# Patient Record
Sex: FEMALE
[De-identification: names, ages, dates, MRNs, and addresses within clinical notes are randomized per-mention and may not be internally consistent; named-entity substitution may affect disease eponyms.]

---
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
## 2021-12-07 ENCOUNTER — OFFICE VISIT (OUTPATIENT)
Dept: FAMILY MEDICINE CLINIC | Facility: CLINIC | Age: 21
End: 2021-12-07
Payer: COMMERCIAL

## 2021-12-07 VITALS
DIASTOLIC BLOOD PRESSURE: 66 MMHG | WEIGHT: 152 LBS | HEIGHT: 62 IN | RESPIRATION RATE: 18 BRPM | HEART RATE: 77 BPM | TEMPERATURE: 98 F | SYSTOLIC BLOOD PRESSURE: 99 MMHG | BODY MASS INDEX: 27.97 KG/M2

## 2021-12-07 DIAGNOSIS — H93.8X3 EAR FULLNESS, BILATERAL: Primary | ICD-10-CM

## 2021-12-07 PROCEDURE — 3078F DIAST BP <80 MM HG: CPT | Performed by: FAMILY MEDICINE

## 2021-12-07 PROCEDURE — 3008F BODY MASS INDEX DOCD: CPT | Performed by: FAMILY MEDICINE

## 2021-12-07 PROCEDURE — 3074F SYST BP LT 130 MM HG: CPT | Performed by: FAMILY MEDICINE

## 2021-12-07 PROCEDURE — 99213 OFFICE O/P EST LOW 20 MIN: CPT | Performed by: FAMILY MEDICINE

## 2021-12-07 RX ORDER — FLUTICASONE PROPIONATE 50 MCG
2 SPRAY, SUSPENSION (ML) NASAL DAILY
Qty: 1 EACH | Refills: 2 | Status: SHIPPED | OUTPATIENT
Start: 2021-12-07 | End: 2022-12-07

## 2021-12-07 RX ORDER — CETIRIZINE HYDROCHLORIDE 10 MG/1
10 TABLET ORAL DAILY
COMMUNITY

## 2021-12-07 RX ORDER — GARLIC EXTRACT 500 MG
1 CAPSULE ORAL DAILY
COMMUNITY

## 2021-12-07 NOTE — PROGRESS NOTES
Subjective:   Patient ID: Stone Rosenberg is a 22year old female. Ear Pain   There is pain in both ears. This is a new problem. The current episode started 1 to 4 weeks ago. The problem has been waxing and waning. There has been no fever.        Histo

## 2022-11-09 ENCOUNTER — OFFICE VISIT (OUTPATIENT)
Dept: FAMILY MEDICINE CLINIC | Facility: CLINIC | Age: 22
End: 2022-11-09
Payer: COMMERCIAL

## 2022-11-09 VITALS
SYSTOLIC BLOOD PRESSURE: 114 MMHG | BODY MASS INDEX: 25.52 KG/M2 | WEIGHT: 144 LBS | RESPIRATION RATE: 19 BRPM | HEIGHT: 63 IN | OXYGEN SATURATION: 98 % | DIASTOLIC BLOOD PRESSURE: 61 MMHG | HEART RATE: 76 BPM

## 2022-11-09 DIAGNOSIS — L72.3 SEBACEOUS CYST: ICD-10-CM

## 2022-11-09 DIAGNOSIS — R22.9 LOCALIZED SKIN MASS, LUMP, OR SWELLING: Primary | ICD-10-CM

## 2022-11-09 PROCEDURE — 3008F BODY MASS INDEX DOCD: CPT | Performed by: FAMILY MEDICINE

## 2022-11-09 PROCEDURE — 3074F SYST BP LT 130 MM HG: CPT | Performed by: FAMILY MEDICINE

## 2022-11-09 PROCEDURE — 3078F DIAST BP <80 MM HG: CPT | Performed by: FAMILY MEDICINE

## 2022-11-09 PROCEDURE — 99213 OFFICE O/P EST LOW 20 MIN: CPT | Performed by: FAMILY MEDICINE

## 2023-03-01 ENCOUNTER — LAB ENCOUNTER (OUTPATIENT)
Dept: LAB | Age: 23
End: 2023-03-01
Attending: FAMILY MEDICINE
Payer: COMMERCIAL

## 2023-03-01 ENCOUNTER — OFFICE VISIT (OUTPATIENT)
Dept: FAMILY MEDICINE CLINIC | Facility: CLINIC | Age: 23
End: 2023-03-01

## 2023-03-01 VITALS
RESPIRATION RATE: 17 BRPM | SYSTOLIC BLOOD PRESSURE: 107 MMHG | DIASTOLIC BLOOD PRESSURE: 73 MMHG | HEIGHT: 62 IN | BODY MASS INDEX: 26.87 KG/M2 | HEART RATE: 78 BPM | OXYGEN SATURATION: 98 % | WEIGHT: 146 LBS

## 2023-03-01 DIAGNOSIS — Z01.419 WELL WOMAN EXAM WITH ROUTINE GYNECOLOGICAL EXAM: Primary | ICD-10-CM

## 2023-03-01 PROCEDURE — 90651 9VHPV VACCINE 2/3 DOSE IM: CPT | Performed by: FAMILY MEDICINE

## 2023-03-01 PROCEDURE — 3074F SYST BP LT 130 MM HG: CPT | Performed by: FAMILY MEDICINE

## 2023-03-01 PROCEDURE — 3008F BODY MASS INDEX DOCD: CPT | Performed by: FAMILY MEDICINE

## 2023-03-01 PROCEDURE — 99395 PREV VISIT EST AGE 18-39: CPT | Performed by: FAMILY MEDICINE

## 2023-03-01 PROCEDURE — 90471 IMMUNIZATION ADMIN: CPT | Performed by: FAMILY MEDICINE

## 2023-03-01 PROCEDURE — 3078F DIAST BP <80 MM HG: CPT | Performed by: FAMILY MEDICINE

## 2023-03-02 LAB
BUN: 15 MG/DL (ref 7–25)
CALCIUM: 9.3 MG/DL (ref 8.6–10.2)
CARBON DIOXIDE: 23 MMOL/L (ref 20–32)
CHLORIDE: 104 MMOL/L (ref 98–110)
CHOL/HDLC RATIO: 3.5 (CALC)
CHOLESTEROL, TOTAL: 166 MG/DL
CREATININE: 0.78 MG/DL (ref 0.5–0.96)
EGFR: 111 ML/MIN/1.73M2
GLUCOSE: 84 MG/DL (ref 65–99)
HDL CHOLESTEROL: 47 MG/DL
LDL-CHOLESTEROL: 98 MG/DL (CALC)
NON-HDL CHOLESTEROL: 119 MG/DL (CALC)
POTASSIUM: 3.9 MMOL/L (ref 3.5–5.3)
SODIUM: 136 MMOL/L (ref 135–146)
TRIGLYCERIDES: 117 MG/DL

## 2023-03-06 LAB
CHLAMYDIA TRACHOMATIS$RNA, TMA: DETECTED
NEISSERIA GONORRHOEAE$RNA, TMA: NOT DETECTED

## 2023-09-11 ENCOUNTER — OFFICE VISIT (OUTPATIENT)
Dept: FAMILY MEDICINE CLINIC | Facility: CLINIC | Age: 23
End: 2023-09-11

## 2023-09-11 VITALS
HEIGHT: 63 IN | OXYGEN SATURATION: 99 % | DIASTOLIC BLOOD PRESSURE: 67 MMHG | WEIGHT: 155 LBS | BODY MASS INDEX: 27.46 KG/M2 | SYSTOLIC BLOOD PRESSURE: 102 MMHG

## 2023-09-11 DIAGNOSIS — R10.2 PELVIC PAIN: ICD-10-CM

## 2023-09-11 DIAGNOSIS — R22.0 SWELLING OF UPPER LIP: Primary | ICD-10-CM

## 2023-09-11 LAB
BILIRUBIN: NEGATIVE
CONTROL LINE PRESENT WITH A CLEAR BACKGROUND (YES/NO): YES YES/NO
GLUCOSE (URINE DIPSTICK): NEGATIVE MG/DL
KETONES (URINE DIPSTICK): NEGATIVE MG/DL
KIT LOT #: 1078 NUMERIC
MULTISTIX LOT#: ABNORMAL NUMERIC
NITRITE, URINE: NEGATIVE
OCCULT BLOOD: NEGATIVE
PH, URINE: 7 (ref 4.5–8)
PREGNANCY TEST, URINE: NEGATIVE
PROTEIN (URINE DIPSTICK): NEGATIVE MG/DL
SPECIFIC GRAVITY: 1.03 (ref 1–1.03)
URINE-COLOR: YELLOW
UROBILINOGEN,SEMI-QN: 0.2 MG/DL (ref 0–1.9)

## 2023-09-11 PROCEDURE — 81002 URINALYSIS NONAUTO W/O SCOPE: CPT | Performed by: FAMILY MEDICINE

## 2023-09-11 PROCEDURE — 81025 URINE PREGNANCY TEST: CPT | Performed by: FAMILY MEDICINE

## 2023-09-11 PROCEDURE — 3008F BODY MASS INDEX DOCD: CPT | Performed by: FAMILY MEDICINE

## 2023-09-11 PROCEDURE — 3078F DIAST BP <80 MM HG: CPT | Performed by: FAMILY MEDICINE

## 2023-09-11 PROCEDURE — 3074F SYST BP LT 130 MM HG: CPT | Performed by: FAMILY MEDICINE

## 2023-09-11 PROCEDURE — 99214 OFFICE O/P EST MOD 30 MIN: CPT | Performed by: FAMILY MEDICINE

## 2023-09-11 NOTE — PROGRESS NOTES
HPI:    Stone Kang is a 25year old female presents clinic with multiple concerns. Last week, the right side of her upper symptoms swollen for 3 days. Does not recall eating any new foods, using any new skin products. Took Zyrtec, and swelling initially subsided. Denies difficulty swallowing, shortness of breath, wheezing. No history of allergies in the past.  Additionally, 2 weeks back patient woke up in the middle the night with severe right-sided pelvic pain. Got up to urinate, felt that pain still did not subside when she got back into bed. Denies fevers, chills, nausea, vomiting, urinary frequency, abnormal vaginal discharge, new sexual partners. Patient's last menstrual period was 08/24/2023 (exact date). Regular cycles, no concerns. Sexually active, not using any contraception. HISTORY:  No past medical history on file. No past surgical history on file. No family history on file. Social History:   Social History     Socioeconomic History    Marital status: Single   Tobacco Use    Smoking status: Never    Smokeless tobacco: Never   Vaping Use    Vaping Use: Never used   Substance and Sexual Activity    Alcohol use: Yes     Comment: Socially    Drug use: Never        Medications (Active prior to today's visit):  No current outpatient medications on file. Allergies:  No Known Allergies      Depression Screening (PHQ-2/PHQ-9): Over the LAST 2 WEEKS                         ROS:   Review of Systems   All other systems reviewed and are negative. PHYSICAL EXAM:      09/11/23  1405   BP: 102/67   BP Location: Right arm   Patient Position: Sitting   Cuff Size: adult   SpO2: 99%   Weight: 155 lb (70.3 kg)   Height: 5' 3\" (1.6 m)     Physical Exam  Constitutional:       General: She is not in acute distress. Cardiovascular:      Rate and Rhythm: Normal rate and regular rhythm. Heart sounds: Normal heart sounds.    Pulmonary:      Effort: Pulmonary effort is normal. No respiratory distress. Breath sounds: Normal breath sounds. Abdominal:      General: Bowel sounds are normal. There is no distension. Palpations: Abdomen is soft. There is no mass. Tenderness: There is abdominal tenderness (right lower quadrant, no rebound). There is no right CVA tenderness, left CVA tenderness, guarding or rebound. Hernia: No hernia is present. Neurological:      Mental Status: She is alert. Mental status is at baseline. Psychiatric:         Mood and Affect: Mood normal.         ASSESSMENT/PLAN:   (R22.0) Swelling of upper lip  (primary encounter diagnosis)  Plan: ALLERGY - INTERNAL  -Completely asymptomatic today, reviewed pictures from when she was symptomatic. Unsure regarding what triggered reaction. Referred to Dr. Krysta Jimenez for further management    (R10.2) Pelvic pain  Plan: URINE CULTURE, ROUTINE, US PELVIS W DOPPLER         (CPT=93975/05894), URINALYSIS NONAUTO W/O         SCOPE, URINE PREGNANCY TEST  -Negative pregnancy test.  UA positive for large leuks. Culture to lab. Patient states that this happened a few months back. Ultrasound pelvis ordered, if urine culture is positive, will treat and if symptoms, no need for ultrasound. If culture is negative, can proceed with imaging. Patient agreeable to plan. Responsible party/patient verbalized understanding of information discussed. No barriers to learning observed. Orders This Visit:  Orders Placed This Encounter      POC Urinalysis, Manual Dip without microscopy [98730]      POC Urine pregnancy test [20084]      Urine Culture, Routine [E]      Meds This Visit:  Requested Prescriptions      No prescriptions requested or ordered in this encounter       Imaging & Referrals:  [facility] - [facility] [facility] (NWR=[phone])       The 21st Century cures Act makes medical notes like these available to patients in the interest of transparency.   However, be advised that this is a medical document. It is intended as peer to peer communication. It is written in medical language and may contain abbreviations or verbiage that are unfamiliar. It may appear blunt or direct. Medical documents are intended to carry relevant information, facts as evident, and the clinical opinion of the practitioner. This note was created by [employer] voice recognition. Errors in content may be related to improper recognition by the system; efforts to review and correct have been done but errors may still exist. Please contact me with any questions.        9/11/2023  Antonella Valdez MD

## 2023-10-03 ENCOUNTER — HOSPITAL ENCOUNTER (OUTPATIENT)
Dept: ULTRASOUND IMAGING | Age: 23
Discharge: HOME | End: 2023-10-03
Attending: FAMILY MEDICINE
Payer: COMMERCIAL

## 2023-10-03 DIAGNOSIS — R10.2 PELVIC PAIN: ICD-10-CM

## 2023-10-03 PROCEDURE — 76856 US EXAM PELVIC COMPLETE: CPT | Performed by: FAMILY MEDICINE

## 2023-10-03 PROCEDURE — 93975 VASCULAR STUDY: CPT | Performed by: FAMILY MEDICINE

## 2023-10-03 PROCEDURE — 76830 TRANSVAGINAL US NON-OB: CPT | Performed by: FAMILY MEDICINE

## 2023-11-29 ENCOUNTER — LAB ENCOUNTER (OUTPATIENT)
Dept: LAB | Age: 23
End: 2023-11-29
Attending: ALLERGY & IMMUNOLOGY
Payer: COMMERCIAL

## 2023-11-29 ENCOUNTER — OFFICE VISIT (OUTPATIENT)
Dept: ALLERGY | Facility: CLINIC | Age: 23
End: 2023-11-29
Payer: COMMERCIAL

## 2023-11-29 VITALS — HEART RATE: 73 BPM | SYSTOLIC BLOOD PRESSURE: 106 MMHG | OXYGEN SATURATION: 92 % | DIASTOLIC BLOOD PRESSURE: 77 MMHG

## 2023-11-29 DIAGNOSIS — T78.3XXA ANGIOEDEMA, INITIAL ENCOUNTER: ICD-10-CM

## 2023-11-29 DIAGNOSIS — Z23 FLU VACCINE NEED: ICD-10-CM

## 2023-11-29 DIAGNOSIS — R22.0 LIP SWELLING: Primary | ICD-10-CM

## 2023-11-29 DIAGNOSIS — Z23 NEED FOR COVID-19 VACCINE: ICD-10-CM

## 2023-11-29 DIAGNOSIS — Z91.09 ENVIRONMENTAL ALLERGIES: ICD-10-CM

## 2023-11-29 LAB — C4 SERPL-MCNC: 49.5 MG/DL (ref 12–36)

## 2023-11-29 PROCEDURE — 86003 ALLG SPEC IGE CRUDE XTRC EA: CPT | Performed by: ALLERGY & IMMUNOLOGY

## 2023-11-29 PROCEDURE — 82785 ASSAY OF IGE: CPT | Performed by: ALLERGY & IMMUNOLOGY

## 2023-11-29 PROCEDURE — 3074F SYST BP LT 130 MM HG: CPT | Performed by: ALLERGY & IMMUNOLOGY

## 2023-11-29 PROCEDURE — 99244 OFF/OP CNSLTJ NEW/EST MOD 40: CPT | Performed by: ALLERGY & IMMUNOLOGY

## 2023-11-29 PROCEDURE — 3078F DIAST BP <80 MM HG: CPT | Performed by: ALLERGY & IMMUNOLOGY

## 2023-11-29 PROCEDURE — 36415 COLL VENOUS BLD VENIPUNCTURE: CPT | Performed by: ALLERGY & IMMUNOLOGY

## 2023-11-29 PROCEDURE — 86160 COMPLEMENT ANTIGEN: CPT | Performed by: ALLERGY & IMMUNOLOGY

## 2023-11-29 RX ORDER — LEVOCETIRIZINE DIHYDROCHLORIDE 5 MG/1
5 TABLET, FILM COATED ORAL EVERY EVENING
Qty: 90 TABLET | Refills: 1 | Status: SHIPPED | OUTPATIENT
Start: 2023-11-29

## 2023-11-29 RX ORDER — CETIRIZINE HYDROCHLORIDE 10 MG/1
10 TABLET ORAL DAILY
COMMUNITY

## 2023-11-29 NOTE — PATIENT INSTRUCTIONS
#1 lip swelling/angioedema  Handouts provided and reviewed  Isolated episode in September that lasted approximately 1 week. No lip swelling. No oral swelling or respiratory issues. No ED visits or prednisone required. Symptoms resolved with Benadryl. No specific trigger by history. Check serum IgE profile to environmental  Check C4 level. Should swelling return recommend trial of Xyzal, levocetirizine 5 mg once a day up to 4 times per day if needed    #2 environmental allergies  Check serum IgE profile to environmental allergens to screen for allergic triggers  Xyzal is an antihistamine  Flonase or Nasacort 2 sprays per nostril once a day if having prominent nasal congestion or postnasal drip    #3 flu vaccine recommended and offered    #4 COVID vaccines reviewed. No doses on file. Query sent. Recommend COVID vaccination. Reviewed I do not have the COVID-vaccine in my office             Orders This Visit:  Orders Placed This Encounter   Procedures    [facility]    Complement C4, Serum       Meds This Visit:  Requested Prescriptions     Signed Prescriptions Disp Refills    levocetirizine 5 MG Oral Tab 90 tablet 1     Sig: Take 1 tablet (5 mg total) by mouth every evening.

## 2023-11-29 NOTE — PROGRESS NOTES
Saadia Dexter is a 26year old female. HPI:     Chief Complaint   Patient presents with    Allergies     Allergic reaction that caused lip swelling. New symptom. She saw Dr. Zaida Wilcox. Occurred first week of Sept. Brought photo. Took a zyrtec. No difficulties breathing, talking or swallowing. Woke up with swollen lips. No new foods or new products that she can think of. Has not happened again. She works at [employer], and opens a lot of boxes, so could be something she had come in contact with at work? Food Allergy     Ate Cactus and caused itching to tongue and throat. Took benadryl and cleared symptoms. Occurred on Halloween. Patient is a 21-year-old female who presents for allergy consultation upon referral of her PCP Dr. Zaida Wilcox with a chief complaint of lip swelling with concern for allergic trigger    Above nursing notes reviewed and appreciated and confirmed with patient. Sept 2023   Patient was at home woke with lip swelling  One-time episode. No recurrence. Better with Zyrtec and antihistamines. Denies oral swelling or respiratory issues. No generalized hives or rashes  Lasted 1 week   Associated with food ingestion: denies   Any NSAIDs alcohol or exercise: denies   No family hx of swelling   No new cosmetics   No hives   No ed visits  Saw pcp,   No steroids needed  + lower lip swelling, no issues with talking or breathing     History of asthma eczema seasonal allergies or food allergies : + jace,     No asthma     Review of immunization records notes no prior COVID vaccines. No flu vaccine on record for this fall        HISTORY:  History reviewed. No pertinent past medical history. History reviewed. No pertinent surgical history. History reviewed. No pertinent family history.    Social History:   Social History     Socioeconomic History    Marital status: Single   Tobacco Use    Smoking status: Never    Smokeless tobacco: Never   Vaping Use    Vaping Use: Never used Substance and Sexual Activity    Alcohol use: Yes     Comment: Socially    Drug use: Never        Medications (Active prior to today's visit):  Current Outpatient Medications   Medication Sig Dispense Refill    cetirizine 10 MG Oral Tab Take 1 tablet (10 mg total) by mouth daily. levocetirizine 5 MG Oral Tab Take 1 tablet (5 mg total) by mouth every evening.  90 tablet 1       Allergies:  No Known Allergies      ROS:     Allergic/Immuno:  See HPI  Cardiovascular:  Negative for irregular heartbeat/palpitations, chest pain, edema  Constitutional:  Negative night sweats,weight loss, irritability and lethargy  Endocrine:  Negative for cold intolerance, polydipsia and polyphagia  ENMT:  Negative for ear drainage, hearing loss and nasal drainage  Eyes:  Negative for eye discharge and vision loss  Gastrointestinal:  Negative for abdominal pain, diarrhea and vomiting  Genitourinary:  Negative for dysuria and hematuria  Hema/Lymph:   see hpi -  Musculoskeletal:  Negative for joint symptoms  Neurological:  Negative for dizziness, seizures  Psychiatric:  Negative for inappropriate interaction and psychiatric symptoms  Respiratory:  Negative for cough, dyspnea and wheezing      PHYSICAL EXAM:   Constitutional: responsive, no acute distress noted  Head/Face: NC/Atraumatic  Eyes/Vision: conjunctiva and lids are normal extraocular motion is intact   Ears/Audiometry: tympanic membranes are normal bilaterally hearing is grossly intact  Nose/Mouth/Throat: nose and throat are clear mucous membranes are moist   Neck/Thyroid: neck is supple without adenopathy  Lymphatic: no abnormal cervical, supraclavicular or axillary adenopathy is noted  Respiratory: normal to inspection lungs are clear to auscultation bilaterally normal respiratory effort   Cardiovascular: regular rate and rhythm no murmurs, gallups, or rubs  Abdomen: soft non-tender non-distended  Skin/Hair: no unusual rashes present  Extremities: no edema, cyanosis, or clubbing  Neurological:Oriented to time, place, person & situation       ASSESSMENT/PLAN:   Assessment   Encounter Diagnoses   Name Primary? Lip swelling Yes    Angioedema, initial encounter     Environmental allergies     Flu vaccine need     Need for COVID-19 vaccine      Patient defers serum IgE testing over skin testing at this time due to concerns for sensitive skin. Dermatographia screen appears negative. #1 lip swelling/angioedema  Handouts provided and reviewed  Isolated episode in September that lasted approximately 1 week. No lip swelling. No oral swelling or respiratory issues. No ED visits or prednisone required. Symptoms resolved with Benadryl. No specific trigger by history. Check serum IgE profile to environmental  Check C4 level. Should swelling return recommend trial of Xyzal, levocetirizine 5 mg once a day up to 4 times per day if needed    #2 environmental allergies  Check serum IgE profile to environmental allergens to screen for allergic triggers  Xyzal is an antihistamine  Flonase or Nasacort 2 sprays per nostril once a day if having prominent nasal congestion or postnasal drip    #3 flu vaccine recommended and offered    #4 COVID vaccines reviewed. No doses on file. Query sent. Recommend COVID vaccination. Reviewed I do not have the COVID-vaccine in my office             Orders This Visit:  Orders Placed This Encounter   Procedures    [facility]    Complement C4, Serum       Meds This Visit:  Requested Prescriptions     Signed Prescriptions Disp Refills    levocetirizine 5 MG Oral Tab 90 tablet 1     Sig: Take 1 tablet (5 mg total) by mouth every evening. Imaging & Referrals:  None     11/29/2023  Nasir Carvajal MD      If medication samples were provided today, they were provided solely for patient education and training related to self administration of these medications. Teaching, instruction and sample was provided to the patient by myself.   Teaching included  a review of potential adverse side effects as well as potential efficacy. Patient's questions were answered in regards to medication administration and dosing and potential side effects.  Teaching was provided via the teach back method

## 2023-12-01 LAB

## 2023-12-02 ENCOUNTER — TELEPHONE (OUTPATIENT)
Dept: ALLERGY | Facility: CLINIC | Age: 23
End: 2023-12-02

## 2023-12-02 NOTE — TELEPHONE ENCOUNTER
----- Message from Debbi Guerrero MD sent at 12/2/2023  7:20 AM CST -----  Please contact patient with negative serum IgE profile to common environmental allergens.

## 2023-12-02 NOTE — TELEPHONE ENCOUNTER
----- Message from Shanel Santo MD sent at 11/30/2023  7:30 AM CST -----  Please contact patient with C4 level 49.5. Slightly elevated. I would be more concerned if her C4 level was low.

## 2023-12-19 NOTE — TELEPHONE ENCOUNTER
Left a message and sent a mychart message for patient to please contact our office to discuss test results.

## 2023-12-19 NOTE — TELEPHONE ENCOUNTER
Spoke with patient. Verified name and . Informed patient of test results per Dr. Jodi Webb, see message below. Patient verbalizes understanding, no further questions at this time.

## (undated) NOTE — LETTER
VACCINE ADMINISTRATION RECORD  PARENT / GUARDIAN APPROVAL  Date: 2019  Vaccine administered to: Emmanuel Clifford     : 2000    MRN: [id number]    A copy of the appropriate Centers for Disease Control and Prevention Vaccine Information stateme